# Patient Record
Sex: MALE | Race: WHITE | HISPANIC OR LATINO | Employment: UNEMPLOYED | URBAN - METROPOLITAN AREA
[De-identification: names, ages, dates, MRNs, and addresses within clinical notes are randomized per-mention and may not be internally consistent; named-entity substitution may affect disease eponyms.]

---

## 2023-09-19 ENCOUNTER — OFFICE VISIT (OUTPATIENT)
Dept: SURGERY | Facility: CLINIC | Age: 68
End: 2023-09-19
Payer: COMMERCIAL

## 2023-09-19 VITALS
SYSTOLIC BLOOD PRESSURE: 142 MMHG | BODY MASS INDEX: 29.06 KG/M2 | DIASTOLIC BLOOD PRESSURE: 80 MMHG | WEIGHT: 174.4 LBS | HEIGHT: 65 IN

## 2023-09-19 DIAGNOSIS — R19.5 POSITIVE COLORECTAL CANCER SCREENING USING COLOGUARD TEST: Primary | ICD-10-CM

## 2023-09-19 PROCEDURE — 99203 OFFICE O/P NEW LOW 30 MIN: CPT | Performed by: SURGERY

## 2023-09-19 RX ORDER — METOPROLOL SUCCINATE 50 MG/1
50 TABLET, EXTENDED RELEASE ORAL DAILY
Qty: 30 TABLET | Refills: 5 | COMMUNITY
Start: 2023-08-08 | End: 2024-02-04

## 2023-09-19 RX ORDER — HYDROCHLOROTHIAZIDE 25 MG/1
25 TABLET ORAL DAILY
Qty: 30 TABLET | Refills: 11 | COMMUNITY
Start: 2023-06-28 | End: 2024-06-27

## 2023-09-19 RX ORDER — ERGOCALCIFEROL 1.25 MG/1
50000 CAPSULE ORAL
Qty: 4 CAPSULE | Refills: 5 | COMMUNITY
Start: 2023-06-28 | End: 2023-12-25

## 2023-09-19 RX ORDER — ATORVASTATIN CALCIUM 20 MG/1
20 TABLET, FILM COATED ORAL DAILY
Qty: 30 TABLET | Refills: 5 | COMMUNITY
Start: 2023-06-28 | End: 2023-12-25

## 2023-09-19 RX ORDER — LISINOPRIL 40 MG/1
40 TABLET ORAL DAILY
Qty: 30 TABLET | Refills: 5 | COMMUNITY
Start: 2023-06-28 | End: 2023-12-25

## 2023-09-19 RX ORDER — MELOXICAM 15 MG/1
15 TABLET ORAL DAILY
Qty: 30 TABLET | Refills: 2 | COMMUNITY
Start: 2023-08-16 | End: 2023-11-14

## 2023-09-19 NOTE — PROGRESS NOTES
Reason for Visit: Positive Cologuard    HPI:  Patient is a very pleasant 68-year-old male  that was referred for evaluation of positive Cologuard testing.  The patient denies any problems with bowel movements.  Reports intermittent very infrequent episodes of constipation.  He otherwise has been able to have regular bowel movements.  Denies any rectal bleeding.  Denies any abdominal pain      Past Medical History:   Diagnosis Date    Hypertension     Prediabetes        History reviewed. No pertinent surgical history.      Current Outpatient Medications:     atorvastatin (LIPITOR) 20 MG tablet, Take 1 tablet by mouth Daily., Disp: 30 tablet, Rfl: 5    BERBERINE CHLORIDE PO, Take  by mouth., Disp: , Rfl:     hydroCHLOROthiazide (HYDRODIURIL) 25 MG tablet, Take 1 tablet by mouth Daily., Disp: 30 tablet, Rfl: 11    KRILL OIL PO, Take 2,000 mg by mouth Daily., Disp: , Rfl:     lisinopril (PRINIVIL,ZESTRIL) 40 MG tablet, Take 1 tablet by mouth Daily., Disp: 30 tablet, Rfl: 5    MAGNESIUM PO, Take  by mouth., Disp: , Rfl:     meloxicam (MOBIC) 15 MG tablet, Take 1 tablet by mouth Daily., Disp: 30 tablet, Rfl: 2    metFORMIN (GLUCOPHAGE) 500 MG tablet, Take 1 tablet by mouth Daily., Disp: 30 tablet, Rfl: 5    metoprolol succinate XL (TOPROL-XL) 50 MG 24 hr tablet, Take 1 tablet by mouth Daily., Disp: 30 tablet, Rfl: 5    vitamin D (ERGOCALCIFEROL) 1.25 MG (46572 UT) capsule capsule, Take 1 capsule by mouth Every 7 (Seven) Days., Disp: 4 capsule, Rfl: 5    No Known Allergies    History reviewed. No pertinent family history.    Social History     Socioeconomic History    Marital status:    Tobacco Use    Smoking status: Every Day     Types: Cigarettes     Passive exposure: Never    Smokeless tobacco: Never   Vaping Use    Vaping Use: Never used   Substance and Sexual Activity    Alcohol use: Not Currently    Drug use: Never        Review Of Systems:  Constitutional: negative for anorexia and chills  Respiratory:  "negative for asthma and cough  Cardiovascular: negative for chest pain and dyspnea  Gastrointestinal: negative for abdominal pain and change in bowel habits  Genitourinary:negative for dysuria and hematuria  Integument/breast: negative for rash and skin color change  Hematologic/lymphatic: negative for bleeding and easy bruising  Musculoskeletal:positive for back pain and stiff joints  Neurological: negative for dizziness and headaches  Behavioral/Psych: negative for irritability and sleep disturbance    Objective:   Physical exam:  /80 (BP Location: Left arm, Patient Position: Sitting, Cuff Size: Adult)   Ht 165.1 cm (65\")   Wt 79.1 kg (174 lb 6.4 oz)   BMI 29.02 kg/m²     General Appearance:  awake, alert, oriented, in no acute distress and well developed, well nourished  Skin:  there are no suspicious lesions or rashes of concern  Head/face:  NCAT  Neck:  neck- supple,non-tender  Lungs:  Breathing Pattern: regular, no distress  Heart:  Heart regular rate and rhythm  Abdomen: Soft, nontender, nondistended    Assessment:    Cem Gallegos is a 68 y.o. male who presents for evaluation for colonoscopy due to positive Cologuard testing.    Patient has no increased risk factors or warning signs or symptoms.     The patient has no family history of colon cancer.  He  has no personal history of colon polyp who presents for colon cancer surveillance evaluation.   I would advise a colonscopy.     The rationale for each option as well as all risks and benefits of each alternative were discussed with the patient in detail.  The patient understands and does wish to proceed with Colonoscopy, Diagnostic        The rationale for colonoscopy with possible biopsy, possible polypectomy, with IV sedation as well as all risks, benefits, and alternatives were discussed with the patient in detail. Risks including but not limited to perforation, bleeding,need for blood transfusion or emergent surgery ,and missed neoplasm were " reviewed in detail with the patient The patient demonstrates full understanding and wishes to proceed with the colonoscopy.

## 2023-09-27 ENCOUNTER — TELEPHONE (OUTPATIENT)
Dept: SURGERY | Facility: CLINIC | Age: 68
End: 2023-09-27
Payer: COMMERCIAL